# Patient Record
(demographics unavailable — no encounter records)

---

## 2025-01-21 NOTE — HISTORY OF PRESENT ILLNESS
[TextBox_4] : 60-year-old female with past medical history of GERD, hypothyroidism, hyperlipidemia, anxiety, active smoker of more than 35 years, history of breast cancer status post lumpectomy and radiation therapy, bladder cancer status post bladder surgery and currently on chemotherapy, presenting for evaluation of cough productive of yellow phlegm, shortness of breath, and occasional cough.  She was told she had asthma before and was placed on Advair 500 twice daily and Xopenex as needed.  Her lungs are clear on exam today.  She does not want the influenza or the pneumonia vaccination.  She follows at [facility] with low-dose CT on a yearly basis.

## 2025-01-21 NOTE — ASSESSMENT
[FreeTextEntry1] : COPD/chronic bronchitis, active smoker of more than 35 years Recent exacerbation requiring urgent care visit Needs low-dose CT of the chest every year Check PFTs Spyro today: mod obstruction (poor test) Continue Advair 500 twice daily and albuterol as needed Add Spiriva 2.5 daily Prednisone given to use in case of exacerbation Smoking cessation strongly encouraged Due for low-dose CT of her lungs in February Will need report of old CTs as well 1 month follow-up

## 2025-01-21 NOTE — PHYSICAL EXAM
[No Acute Distress] : no acute distress [Normal Oropharynx] : normal oropharynx [Normal Appearance] : normal appearance [No Neck Mass] : no neck mass [Normal Rate/Rhythm] : normal rate/rhythm [Normal S1, S2] : normal s1, s2 [No Murmurs] : no murmurs [Clear to Auscultation Bilaterally] : clear to auscultation bilaterally [No Abnormalities] : no abnormalities [Benign] : benign [Normal Gait] : normal gait [No Clubbing] : no clubbing [No Cyanosis] : no cyanosis [No Edema] : no edema [FROM] : FROM [Normal Color/ Pigmentation] : normal color/ pigmentation [No Focal Deficits] : no focal deficits [Oriented x3] : oriented x3 [Normal Affect] : normal affect

## 2025-03-19 NOTE — ASSESSMENT
[FreeTextEntry1] : Smoker of more than 35 pack years Likely has underlying COPD Low-dose CT with right middle lobe atelectasis Multiple small nodules that are likely benign Given the right middle lobe atelectatic finding we will repeat CT 2 months from the original test PFTs are pending to be done in the office Continue Anoro daily and albuterol as needed 7-day course of prednisone given in case of exacerbation Smoking cessation strongly encouraged 2 months follow-up

## 2025-03-19 NOTE — HISTORY OF PRESENT ILLNESS
[TextBox_4] : 60-year-old female with past medical history of GERD, hypothyroidism, hyperlipidemia, anxiety, active smoker of more than 35 years, history of breast cancer status post lumpectomy and radiation therapy, bladder cancer status post bladder surgery and currently on chemotherapy, presenting for evaluation of cough productive of yellow phlegm, shortness of breath, and occasional cough.  She was told she had asthma before and was placed on Advair 500 twice daily and Xopenex as needed.  Her lungs are clear on exam today.  She does not want the influenza or the pneumonia vaccination.  She follows at [facility] with low-dose CT on a yearly basis.  3/19/2025: Anoro was approved by her insurance.  Advised to use once daily.  Continue albuterol as needed.  Smoking cessation again strongly recommended.  Low-dose CT for lung cancer screening done with evidence of right middle lobe atelectasis although  no endobronchial lesion appears on the CT.  Repeat CT ordered.  Lungs are clear on exam today.

## 2025-05-27 NOTE — ASSESSMENT
[FreeTextEntry1] : Smoker of more than 35 pack years Likely has underlying COPD - PFTs pending  Low-dose CT with right middle lobe atelectasis - resolved on repeat CT  Multiple small nodules measuring 6 mm - CT in 6 months  PFTs are pending to be done in the office Continue Trelegy 200 daily  and albuterol nebs as needed She has been on prednisone for over a month; advised to stop slowly; taper given  Smoking cessation strongly encouraged; nicotine patch ordered  2 months follow up

## 2025-05-27 NOTE — REVIEW OF SYSTEMS
I assume primary medical responsibility for this patient. I have reviewed the history, physical, and assessement & treatment plan with the resident and agree that the care is reasonable and necessary. This service has been performed by a resident without the presence of a teaching physician under the primary care exception. If necessary, an addendum of additional findings or evaluation beyond the resident documentation will be noted below.     Lubna Cross MD   [Cough] : cough [Sputum] : sputum [Wheezing] : wheezing [SOB on Exertion] : sob on exertion [Negative] : Endocrine

## 2025-05-27 NOTE — HISTORY OF PRESENT ILLNESS
[TextBox_4] : 60-year-old female with past medical history of GERD, hypothyroidism, hyperlipidemia, anxiety, active smoker of more than 35 years, history of breast cancer status post lumpectomy and radiation therapy, bladder cancer status post bladder surgery and currently on chemotherapy, presenting for evaluation of cough productive of yellow phlegm, shortness of breath, and occasional cough.  She was told she had asthma before and was placed on Advair 500 twice daily and Xopenex as needed.  Her lungs are clear on exam today.  She does not want the influenza or the pneumonia vaccination.  She follows at [facility] with low-dose CT on a yearly basis.   5/27/2025: On Trelegy enemy short of breath.  She continues to smoke daily.  She is on Xopenex and albuterol nebulizers as well.  Repeat CT of the chest with resolution of the right middle lobe atelectasis and 6 mm lung nodules noted.  PFTs not yet done.  Somehow she has been taking prednisone 20 mg for a whole month (this was given to her by her primary care doctor).  She is interested in smoking cessation.

## 2025-07-16 NOTE — REASON FOR VISIT
[Initial Evaluation] : an initial evaluation for [FreeTextEntry2] : clogged ears, bilateral impacted cerumen

## 2025-07-16 NOTE — HISTORY OF PRESENT ILLNESS
[de-identified] : Patient presents today c/o clogged ears, bilateral impacted cerumen. Pt states she is here for wax removal. Pt states she occasionally has pain in her right ear. Pt states she hears well. No further complaints or concern.   Denies subjective hearing loss, tinnitus, vertigo.

## 2025-07-16 NOTE — PHYSICAL EXAM
[de-identified] : right worse than left tmj crepitus and pain [de-identified] : No obstructive cerumen - otoscopy photographs taken.  [de-identified] : No middle ear effusion Bilterally.  [Midline] : trachea located in midline position [Normal] : palpation of lymph nodes is normal

## 2025-07-16 NOTE — ASSESSMENT
[FreeTextEntry1] : Bilateral sensation of ear fullness (right predominant) no cerumen impaction. TMJ crepitus, right worse than left.  TMJ management techniques discussed at length including massage, and stretching.   with normal hearing thresholds and type A tymps bilaterally Follow up in 3-6 months.   Total time spent on patient encounter including review of patient's medical history, physical examination, interpretation of any indicated labs / imaging and counseling, excluding time spent performing any indicated procedures: 46 minutes.     Camilo Bose MD, MPH Director of Pediatric Otolaryngology [facility] / [facility]

## 2025-07-16 NOTE — PHYSICAL EXAM
[de-identified] : right worse than left tmj crepitus and pain [de-identified] : No obstructive cerumen - otoscopy photographs taken.  [de-identified] : No middle ear effusion Bilterally.  [Midline] : trachea located in midline position [Normal] : palpation of lymph nodes is normal

## 2025-07-16 NOTE — HISTORY OF PRESENT ILLNESS
[de-identified] : Patient presents today c/o clogged ears, bilateral impacted cerumen. Pt states she is here for wax removal. Pt states she occasionally has pain in her right ear. Pt states she hears well. No further complaints or concern.   Denies subjective hearing loss, tinnitus, vertigo.

## 2025-07-16 NOTE — ASSESSMENT
[FreeTextEntry1] : Bilateral sensation of ear fullness (right predominant) no cerumen impaction. TMJ crepitus, right worse than left.  TMJ management techniques discussed at length including massage, and stretching.   with normal hearing thresholds and type A tymps bilaterally Follow up in 3-6 months.   Total time spent on patient encounter including review of patient's medical history, physical examination, interpretation of any indicated labs / imaging and counseling, excluding time spent performing any indicated procedures: 46 minutes.     Camilo Bose MD, MPH Director of Pediatric Otolaryngology [facility] / [facility]

## 2025-07-29 NOTE — ASSESSMENT
[FreeTextEntry1] : Smoker of more than 35 pack years PFTs are pending; previous spirometry with moderate degree of obstruction She has multiple small lung nodules on CT and due for repeat in October Planning to get PFTs during next visit Continue Trelegy 200 daily and albuterol nebulizers as needed She had a recent exacerbation requiring hospitalization Prednisone low-dose taper given over 10 days Smoking cessation strongly encouraged She will consider acupuncture Roflumilast offered but she declined due to possible side effect profile The plan is to get a CBC with differential next visit and get the PFTs 2-week follow-up

## 2025-07-29 NOTE — HISTORY OF PRESENT ILLNESS
[TextBox_4] : 60-year-old female with past medical history of GERD, hypothyroidism, hyperlipidemia, anxiety, active smoker of more than 35 years, history of breast cancer status post lumpectomy and radiation therapy, bladder cancer status post bladder surgery and currently on chemotherapy, presenting for evaluation of cough productive of yellow phlegm, shortness of breath, and occasional cough.  She was told she had asthma before and was placed on Advair 500 twice daily and Xopenex as needed.  Her lungs are clear on exam today.  She does not want the influenza or the pneumonia vaccination.  She follows at [facility] with low-dose CT on a yearly basis.  7/29/2025: Had another recent exacerbation.  Continues to smoke 7 to 8 cigarettes a day.  Remains on Trelegy 200 daily and albuterol nebulizers.  Due for repeat CT in October.  Unable to get CBC because she remains on prednisone.  Not interested in Roflumilast.